# Patient Record
Sex: MALE | Race: WHITE | NOT HISPANIC OR LATINO | URBAN - NONMETROPOLITAN AREA
[De-identification: names, ages, dates, MRNs, and addresses within clinical notes are randomized per-mention and may not be internally consistent; named-entity substitution may affect disease eponyms.]

---

## 2017-04-20 ENCOUNTER — OFFICE VISIT (OUTPATIENT)
Dept: UROLOGY | Facility: CLINIC | Age: 82
End: 2017-04-20

## 2017-04-20 DIAGNOSIS — E29.1 HYPOGONADISM, TESTICULAR: Primary | ICD-10-CM

## 2017-04-20 LAB
ALBUMIN SERPL-MCNC: 4.9 G/DL (ref 3.4–4.8)
ALBUMIN/GLOB SERPL: 1.9 G/DL (ref 1.5–2.5)
ALP SERPL-CCNC: 89 U/L (ref 40–129)
ALT SERPL W P-5'-P-CCNC: 22 U/L (ref 10–44)
ANION GAP SERPL CALCULATED.3IONS-SCNC: 6.9 MMOL/L (ref 3.6–11.2)
AST SERPL-CCNC: 24 U/L (ref 10–34)
BILIRUB CONJ SERPL-MCNC: 0.1 MG/DL (ref 0–0.2)
BILIRUB SERPL-MCNC: 0.4 MG/DL (ref 0.2–1.8)
BUN BLD-MCNC: 24 MG/DL (ref 7–21)
BUN/CREAT SERPL: 17 (ref 7–25)
CALCIUM SPEC-SCNC: 10.3 MG/DL (ref 7.7–10)
CHLORIDE SERPL-SCNC: 109 MMOL/L (ref 99–112)
CO2 SERPL-SCNC: 24.1 MMOL/L (ref 24.3–31.9)
CREAT BLD-MCNC: 1.41 MG/DL (ref 0.43–1.29)
DEPRECATED RDW RBC AUTO: 50.3 FL (ref 37–54)
ERYTHROCYTE [DISTWIDTH] IN BLOOD BY AUTOMATED COUNT: 15.8 % (ref 11.5–14.5)
GFR SERPL CREATININE-BSD FRML MDRD: 48 ML/MIN/1.73
GLOBULIN UR ELPH-MCNC: 2.6 GM/DL
GLUCOSE BLD-MCNC: 112 MG/DL (ref 70–110)
HCT VFR BLD AUTO: 44.1 % (ref 42–52)
HGB BLD-MCNC: 13.9 G/DL (ref 14–18)
MCH RBC QN AUTO: 27.5 PG (ref 27–33)
MCHC RBC AUTO-ENTMCNC: 31.5 G/DL (ref 33–37)
MCV RBC AUTO: 87.2 FL (ref 80–94)
OSMOLALITY SERPL CALC.SUM OF ELEC: 284.2 MOSM/KG (ref 273–305)
PLATELET # BLD AUTO: 251 10*3/MM3 (ref 130–400)
PMV BLD AUTO: 10.4 FL (ref 6–10)
POTASSIUM BLD-SCNC: 4.3 MMOL/L (ref 3.5–5.3)
PROT SERPL-MCNC: 7.5 G/DL (ref 6–8)
RBC # BLD AUTO: 5.06 10*6/MM3 (ref 4.7–6.1)
SODIUM BLD-SCNC: 140 MMOL/L (ref 135–153)
WBC NRBC COR # BLD: 11.26 10*3/MM3 (ref 4.5–12.5)

## 2017-04-20 PROCEDURE — 99213 OFFICE O/P EST LOW 20 MIN: CPT | Performed by: UROLOGY

## 2017-04-20 PROCEDURE — 82248 BILIRUBIN DIRECT: CPT | Performed by: UROLOGY

## 2017-04-20 PROCEDURE — 36415 COLL VENOUS BLD VENIPUNCTURE: CPT | Performed by: UROLOGY

## 2017-04-20 PROCEDURE — 80053 COMPREHEN METABOLIC PANEL: CPT | Performed by: UROLOGY

## 2017-04-20 PROCEDURE — 85027 COMPLETE CBC AUTOMATED: CPT | Performed by: UROLOGY

## 2017-04-20 NOTE — PROGRESS NOTES
Chief Complaint:          Chief Complaint   Patient presents with   • Hypogonadism       HPI:   82 y.o. male.    HPI    Pt has a reaction to androderm.  He wants to try injectable testosteron    Past Medical History:        Past Medical History:   Diagnosis Date   • Anxiety    • BPH (benign prostatic hypertrophy)    • Cancer    • Carotid atherosclerosis    • COPD (chronic obstructive pulmonary disease)    • Coronary artery disease    • Eczema    • Erectile dysfunction    • Fatigue    • Heart disease    • Hypertension    • Hypogonadism in male    • Ulcer of esophagus with bleeding          Current Meds:     Current Outpatient Prescriptions   Medication Sig Dispense Refill   • albuterol (PROVENTIL) (2.5 MG/3ML) 0.083% nebulizer solution every 6 (six) hours.     • ALPRAZolam (XANAX) 0.5 MG tablet Take  by mouth.     • amLODIPine (NORVASC) 10 MG tablet Take  by mouth daily.     • aspirin 81 MG chewable tablet Chew daily.     • atorvastatin (LIPITOR) 80 MG tablet Take  by mouth.     • clopidogrel (PLAVIX) 75 MG tablet Take  by mouth daily.     • DHEA 50 MG capsule Take  by mouth.     • famotidine (PEPCID) 20 MG tablet Take  by mouth 2 (two) times a day.     • hydrALAZINE (APRESOLINE) 100 MG tablet Take  by mouth.     • montelukast (SINGULAIR) 10 MG tablet Take  by mouth.     • Multiple Vitamins-Minerals (PRESERVISION AREDS) capsule Take  by mouth.     • pantoprazole (PROTONIX) 40 MG pack packet Take  by mouth.     • ramipril (ALTACE) 10 MG capsule Take  by mouth every 12 (twelve) hours.     • testosterone (ANDRODERM) 4 MG/24HR patch 24 hour 24 hour patch Place  on the skin.     • zolpidem (AMBIEN) 5 MG tablet Take  by mouth.       No current facility-administered medications for this visit.         Allergies:      Allergies   Allergen Reactions   • Azithromycin    • Erythromycin    • Latex    • Ondansetron    • Sulfa Antibiotics         Past Surgical History:     Past Surgical History:   Procedure Laterality Date   •  CARDIAC SURGERY     • CHOLECYSTECTOMY     • COLON SURGERY           Social History:     Social History     Social History   • Marital status:      Spouse name: N/A   • Number of children: N/A   • Years of education: N/A     Occupational History   • Not on file.     Social History Main Topics   • Smoking status: Former Smoker   • Smokeless tobacco: Not on file   • Alcohol use No   • Drug use: No   • Sexual activity: Not on file     Other Topics Concern   • Not on file     Social History Narrative       Family History:     Family History   Problem Relation Age of Onset   • Hypertension Father    • Heart disease Father    • Hypertension Mother    • Heart disease Mother        Review of Systems:     Review of Systems    Physical Exam:     Physical Exam    Procedure:     No notes on file      Assessment:     Encounter Diagnosis   Name Primary?   • Hypogonadism, testicular Yes     No orders of the defined types were placed in this encounter.      Plan:   Rx Testosterone, Cialis return in 6 months.  Will see pt back in 6 months  Counseling was given to patient and family for the following topics instructions for management. and the interim medical history and current results were reviewed.  A treatment plan with follow-up was made. Total time of the encounter was 21 minutes and 21 minutes were spent discussing Hypogonadism, testicular [E29.1] face-to-face.        This document has been electronically signed by Alexis Briscoe MD April 20, 2017 2:47 PM

## 2017-05-02 DIAGNOSIS — R53.1 WEAKNESS: Primary | ICD-10-CM
